# Patient Record
Sex: FEMALE | Race: WHITE | NOT HISPANIC OR LATINO | URBAN - METROPOLITAN AREA
[De-identification: names, ages, dates, MRNs, and addresses within clinical notes are randomized per-mention and may not be internally consistent; named-entity substitution may affect disease eponyms.]

---

## 2022-12-06 ENCOUNTER — APPOINTMENT (RX ONLY)
Dept: URBAN - METROPOLITAN AREA CLINIC 141 | Facility: CLINIC | Age: 42
Setting detail: DERMATOLOGY
End: 2022-12-06

## 2022-12-06 DIAGNOSIS — Z41.9 ENCOUNTER FOR PROCEDURE FOR PURPOSES OTHER THAN REMEDYING HEALTH STATE, UNSPECIFIED: ICD-10-CM

## 2022-12-06 PROCEDURE — ? BOTOX

## 2022-12-06 PROCEDURE — ? ADDITIONAL NOTES

## 2022-12-06 ASSESSMENT — LOCATION ZONE DERM
LOCATION ZONE: EYELID
LOCATION ZONE: FACE
LOCATION ZONE: FACE

## 2022-12-06 ASSESSMENT — LOCATION DETAILED DESCRIPTION DERM
LOCATION DETAILED: RIGHT INFERIOR TEMPLE
LOCATION DETAILED: LEFT SUPERIOR LATERAL MALAR CHEEK
LOCATION DETAILED: RIGHT MEDIAL FOREHEAD
LOCATION DETAILED: LEFT FOREHEAD
LOCATION DETAILED: RIGHT SUPERIOR CENTRAL MALAR CHEEK
LOCATION DETAILED: RIGHT SUPERIOR LATERAL MALAR CHEEK
LOCATION DETAILED: RIGHT FOREHEAD
LOCATION DETAILED: RIGHT LATERAL CANTHUS
LOCATION DETAILED: GLABELLA
LOCATION DETAILED: LEFT SUPERIOR CENTRAL MALAR CHEEK
LOCATION DETAILED: LEFT INFERIOR TEMPLE

## 2022-12-06 ASSESSMENT — LOCATION SIMPLE DESCRIPTION DERM
LOCATION SIMPLE: RIGHT CHEEK
LOCATION SIMPLE: LEFT FOREHEAD
LOCATION SIMPLE: RIGHT CHEEK
LOCATION SIMPLE: GLABELLA
LOCATION SIMPLE: LEFT TEMPLE
LOCATION SIMPLE: LEFT CHEEK
LOCATION SIMPLE: RIGHT TEMPLE
LOCATION SIMPLE: RIGHT FOREHEAD
LOCATION SIMPLE: LEFT CHEEK
LOCATION SIMPLE: RIGHT EYELID

## 2022-12-06 NOTE — PROCEDURE: BOTOX
Show Inventory Tab: Show
Show Forehead Units: Yes
L Brow Units: 0
Additional Area 2 Location: LEFT CROW
Show Mentalis Units: No
Price (Use Numbers Only, No Special Characters Or $): 13.00
Dilution (U/0.1 Cc): 2
Forehead Units: [address]
Consent: Written consent obtained. Risks include but not limited to lid/brow ptosis, bruising, swelling, diplopia, temporary effect, incomplete chemical denervation.
Detail Level: Detailed
Glabellar Complex Units: 15
Post-Care Instructions: Patient instructed to not lie down for 4 hours and limit physical activity for 24 hours.
Additional Area 1 Location: RIGHT CROW
Inferior Lateral Orbicularis Oculi Units: 16

## 2022-12-06 NOTE — PROCEDURE: ADDITIONAL NOTES
Detail Level: Detailed
Render Risk Assessment In Note?: no
Additional Notes: $13.00 a unit per  [place]

## 2023-05-01 ENCOUNTER — APPOINTMENT (RX ONLY)
Dept: URBAN - METROPOLITAN AREA CLINIC 141 | Facility: CLINIC | Age: 43
Setting detail: DERMATOLOGY
End: 2023-05-01

## 2023-05-01 DIAGNOSIS — Z41.9 ENCOUNTER FOR PROCEDURE FOR PURPOSES OTHER THAN REMEDYING HEALTH STATE, UNSPECIFIED: ICD-10-CM

## 2023-05-01 PROCEDURE — ? BOTOX

## 2023-05-01 PROCEDURE — ? ADDITIONAL NOTES

## 2023-05-01 NOTE — PROCEDURE: BOTOX
[facility] Units: 0
Additional Area 1 Location: RIGHT CROW
Incrementing Botox Units: By 0.5 Units
Show Right And Left Periorbital Units: No
Show Masseter Units: Yes
Dilution (U/0.1 Cc): 2
Post-Care Instructions: Patient instructed to not lie down for 4 hours and limit physical activity for 24 hours.
Detail Level: Detailed
Forehead Units: [address]
Show Inventory Tab: Show
Glabellar Complex Units: 15
Inferior Lateral Orbicularis Oculi Units: 16
Additional Area 2 Location: LEFT CROW
Price (Use Numbers Only, No Special Characters Or $): 13.00
Consent: Written consent obtained. Risks include but not limited to lid/brow ptosis, bruising, swelling, diplopia, temporary effect, incomplete chemical denervation.

## 2023-05-01 NOTE — PROCEDURE: ADDITIONAL NOTES
Detail Level: Simple
Additional Notes: Per [place] $13.00 per unit
Render Risk Assessment In Note?: no

## 2023-11-14 ENCOUNTER — APPOINTMENT (RX ONLY)
Dept: URBAN - METROPOLITAN AREA CLINIC 141 | Facility: CLINIC | Age: 43
Setting detail: DERMATOLOGY
End: 2023-11-14

## 2023-11-14 DIAGNOSIS — Z41.9 ENCOUNTER FOR PROCEDURE FOR PURPOSES OTHER THAN REMEDYING HEALTH STATE, UNSPECIFIED: ICD-10-CM

## 2023-11-14 PROCEDURE — ? BOTOX

## 2023-11-14 NOTE — PROCEDURE: BOTOX
Show Periorbital Units: Yes
L Brow Units: 0
Price (Use Numbers Only, No Special Characters Or $): 13.00
Periorbital Skin Units: [address]
Show Ucl Units: No
Consent: Written consent obtained. Risks include but not limited to lid/brow ptosis, bruising, swelling, diplopia, temporary effect, incomplete chemical denervation.
Post-Care Instructions: Patient instructed to not lie down for 4 hours and limit physical activity for 24 hours.
Dilution (U/0.1 Cc): 2
Incrementing Botox Units: By 0.5 Units
Forehead Units: 16
Patient Specific Comments (Will Not Stick From Patient To Patient): $12.00 per unit per Provider.
Detail Level: Detailed
Show Inventory Tab: Show
Glabellar Complex Units: 15

## 2024-12-03 ENCOUNTER — APPOINTMENT (OUTPATIENT)
Dept: URBAN - METROPOLITAN AREA CLINIC 141 | Facility: CLINIC | Age: 44
Setting detail: DERMATOLOGY
End: 2024-12-03

## 2024-12-03 DIAGNOSIS — Z41.9 ENCOUNTER FOR PROCEDURE FOR PURPOSES OTHER THAN REMEDYING HEALTH STATE, UNSPECIFIED: ICD-10-CM

## 2024-12-03 PROCEDURE — ? BOTOX

## 2024-12-03 PROCEDURE — ? ADDITIONAL NOTES

## 2024-12-03 NOTE — PROCEDURE: ADDITIONAL NOTES
Additional Notes: 12/3/24 per [place] $13.00 per unit
Render Risk Assessment In Note?: no
Detail Level: Zone

## 2024-12-03 NOTE — PROCEDURE: BOTOX
Dilution (U/0.1 Cc): 2
Lateral Platysmal Bands Units: 0
Post-Care Instructions: Patient instructed to not lie down for 4 hours and limit physical activity for 24 hours.
Show Masseter Units: Yes
Additional Area 1 Units: 8
Show Right And Left Periorbital Units: No
Glabellar Complex Units: 15
Incrementing Botox Units: By 0.5 Units
Additional Area 2 Location: LEFT CROW
Forehead Units: 18
Detail Level: Detailed
Price (Use Numbers Only, No Special Characters Or $): 13.00
Show Inventory Tab: Show
Consent: Written consent obtained. Risks include but not limited to lid/brow ptosis, bruising, swelling, diplopia, temporary effect, incomplete chemical denervation.
Additional Area 1 Location: RIGHT CROW